# Patient Record
Sex: FEMALE | Race: WHITE | NOT HISPANIC OR LATINO | Employment: OTHER | URBAN - NONMETROPOLITAN AREA
[De-identification: names, ages, dates, MRNs, and addresses within clinical notes are randomized per-mention and may not be internally consistent; named-entity substitution may affect disease eponyms.]

---

## 2017-10-09 RX ORDER — DILTIAZEM HYDROCHLORIDE 120 MG/1
CAPSULE, EXTENDED RELEASE ORAL
Qty: 30 CAPSULE | Refills: 5 | Status: SHIPPED | OUTPATIENT
Start: 2017-10-09 | End: 2022-08-25 | Stop reason: SDUPTHER

## 2022-05-18 ENCOUNTER — OFFICE VISIT (OUTPATIENT)
Dept: CARDIOLOGY | Facility: CLINIC | Age: 47
End: 2022-05-18

## 2022-05-18 VITALS
OXYGEN SATURATION: 98 % | HEIGHT: 67 IN | BODY MASS INDEX: 22.13 KG/M2 | DIASTOLIC BLOOD PRESSURE: 77 MMHG | SYSTOLIC BLOOD PRESSURE: 115 MMHG | WEIGHT: 141 LBS | HEART RATE: 64 BPM

## 2022-05-18 DIAGNOSIS — R07.89 CHEST TIGHTNESS: ICD-10-CM

## 2022-05-18 DIAGNOSIS — R00.0 INAPPROPRIATE SINUS TACHYCARDIA: ICD-10-CM

## 2022-05-18 DIAGNOSIS — R06.02 SHORTNESS OF BREATH: ICD-10-CM

## 2022-05-18 DIAGNOSIS — R00.2 PALPITATIONS: Primary | ICD-10-CM

## 2022-05-18 PROCEDURE — 99204 OFFICE O/P NEW MOD 45 MIN: CPT | Performed by: NURSE PRACTITIONER

## 2022-05-18 PROCEDURE — 93000 ELECTROCARDIOGRAM COMPLETE: CPT | Performed by: NURSE PRACTITIONER

## 2022-05-18 RX ORDER — GABAPENTIN 600 MG/1
600 TABLET ORAL 2 TIMES DAILY
COMMUNITY

## 2022-05-18 RX ORDER — METOPROLOL SUCCINATE 25 MG/1
25 TABLET, EXTENDED RELEASE ORAL DAILY
Qty: 30 TABLET | Refills: 11 | Status: SHIPPED | OUTPATIENT
Start: 2022-05-18

## 2022-05-18 RX ORDER — HYDROCODONE BITARTRATE AND ACETAMINOPHEN 5; 325 MG/1; MG/1
1 TABLET ORAL EVERY 6 HOURS PRN
COMMUNITY

## 2022-05-18 RX ORDER — BISOPROLOL FUMARATE 10 MG/1
10 TABLET, FILM COATED ORAL DAILY
COMMUNITY
End: 2022-05-18 | Stop reason: SDUPTHER

## 2022-05-18 NOTE — PROGRESS NOTES
Subjective     Karen Gabehart is a 46 y.o. female who presents to day for Establish Care and COPD.    CHIEF COMPLIANT  Chief Complaint   Patient presents with   • Establish Care   • COPD       Active Problems:  Problem List Items Addressed This Visit        Cardiac and Vasculature    Palpitations - Primary    Relevant Medications    metoprolol succinate XL (TOPROL-XL) 25 MG 24 hr tablet    Other Relevant Orders    ECG 12 Lead    Cardiac Event Monitor    Adult Transthoracic Echo Complete W/ Cont if Necessary Per Protocol    Chest tightness    Relevant Orders    ECG 12 Lead    Adult Transthoracic Echo Complete W/ Cont if Necessary Per Protocol    Inappropriate sinus tachycardia    Relevant Medications    metoprolol succinate XL (TOPROL-XL) 25 MG 24 hr tablet    Other Relevant Orders    ECG 12 Lead    Adult Transthoracic Echo Complete W/ Cont if Necessary Per Protocol       Pulmonary and Pneumonias    Shortness of breath    Relevant Orders    ECG 12 Lead    Adult Transthoracic Echo Complete W/ Cont if Necessary Per Protocol      Problem list  1.  Shortness of breath  2.  Palpitations  3.  Chest tightness  4.  Hypertension    HPI  HPI  Ms. Gabehart is a 46-year-old female patient who is being seen today to establish care for cardiac evaluation of palpitations, shortness of breath and chest tightness.  Patient does have a history of SVT which she has palpitations with intermittent racing type sensations in her chest that is worse with activity but can occur at rest.  She says her heart rate will increase up to 178 with minimal activity and she becomes short of breath.  She is currently on diltiazem but does not seem to be working.  She reports that her shortness of breath is significantly worse with palpitations as well as activity and she has to stop and rest.  Along with the palpitations she does have intermittent chest pressure in which she is on isosorbide and Ranexa for antianginal therapy.  She does have a history  of chronic arterial hypertension as well in which she is being followed by her PCP.  Today her blood pressure is 115/77.  She is currently on diltiazem for her blood pressure.  She also has fatigue where she gets tired easily.  Dizziness and lightheadedness is expected if she changes positions too quickly.  She denies lower extremity edema, syncope, PND, orthopnea, or strokelike symptoms.    PRIOR MEDS  Current Outpatient Medications on File Prior to Visit   Medication Sig Dispense Refill   • diltiaZEM (TIAZAC) 120 MG 24 hr capsule TAKE ONE CAPSULE BY MOUTH DAILY 30 capsule 5   • gabapentin (NEURONTIN) 600 MG tablet Take 600 mg by mouth 2 (Two) Times a Day.     • HYDROcodone-acetaminophen (NORCO) 5-325 MG per tablet Take 1 tablet by mouth Every 6 (Six) Hours As Needed.       No current facility-administered medications on file prior to visit.       ALLERGIES  Patient has no known allergies.    HISTORY  Past Medical History:   Diagnosis Date   • Angina pectoris ([facility])    • Arrhythmia    • Arthritis    • Asthma    • Cancer, bladder, neck ([facility]) 06/2021   • COPD (chronic obstructive pulmonary disease) ([facility])    • Hypertension    • Irregular heart beat     pt states tachy at times   • SVT (supraventricular tachycardia) ([facility])        Social History     Socioeconomic History   • Marital status:    Tobacco Use   • Smoking status: Current Every Day Smoker     Packs/day: 0.50     Years: 30.00     Pack years: 15.00     Types: Cigarettes   • Smokeless tobacco: Never Used   Vaping Use   • Vaping Use: Never used   Substance and Sexual Activity   • Alcohol use: Yes     Alcohol/week: 2.0 standard drinks     Types: 2 Cans of beer per week     Comment: occasionally   • Drug use: No   • Sexual activity: Defer       Family History   Problem Relation Age of Onset   • Arrhythmia Mother        Review of Systems   Constitutional: Positive for fatigue. Negative for chills and fever.   HENT: Negative for congestion, rhinorrhea and sore  "throat.    Respiratory: Positive for chest tightness and shortness of breath.    Cardiovascular: Positive for chest pain and palpitations. Negative for leg swelling.   Gastrointestinal: Negative for constipation, diarrhea and nausea.   Musculoskeletal: Positive for arthralgias, back pain and neck pain.   Allergic/Immunologic: Positive for environmental allergies. Negative for food allergies.   Neurological: Positive for dizziness (getting up and down), weakness and light-headedness. Negative for syncope.   Hematological: Bruises/bleeds easily (bruise).   Psychiatric/Behavioral: Negative for sleep disturbance.       Objective     VITALS: /77 (BP Location: Left arm, Patient Position: Sitting)   Pulse 64   Ht 170.2 cm (67\")   Wt 64 kg (141 lb)   SpO2 98%   BMI 22.08 kg/m²     LABS:   Lab Results (most recent)     None          IMAGING:   No Images in the past 120 days found..    EXAM:  Physical Exam  Vitals and nursing note reviewed.   Constitutional:       Appearance: She is well-developed.   HENT:      Head: Normocephalic.   Eyes:      Pupils: Pupils are equal, round, and reactive to light.   Neck:      Thyroid: No thyroid mass.      Vascular: No carotid bruit or JVD.      Trachea: Trachea and phonation normal.   Cardiovascular:      Rate and Rhythm: Normal rate and regular rhythm.      Pulses:           Radial pulses are 2+ on the right side and 2+ on the left side.        Posterior tibial pulses are 2+ on the right side and 2+ on the left side.      Heart sounds: Normal heart sounds. No murmur heard.    No friction rub. No gallop.   Pulmonary:      Effort: Pulmonary effort is normal. No respiratory distress.      Breath sounds: Normal breath sounds. No wheezing or rales.   Abdominal:      General: Bowel sounds are normal.      Palpations: Abdomen is soft.   Musculoskeletal:         General: No swelling. Normal range of motion.      Cervical back: Neck supple.   Skin:     General: Skin is warm and dry.    "   Capillary Refill: Capillary refill takes less than 2 seconds.      Findings: No rash.   Neurological:      Mental Status: She is alert and oriented to person, place, and time.   Psychiatric:         Speech: Speech normal.         Behavior: Behavior normal.         Thought Content: Thought content normal.         Judgment: Judgment normal.         Procedure     ECG 12 Lead    Date/Time: 5/18/2022 11:28 AM  Performed by: Polo Neville APRN  Authorized by: Polo Neville APRN   Previous ECG: no previous ECG available  Rhythm: sinus bradycardia  Rate: tachycardic  BPM: 56  QRS axis: normal  Comments:  ms  No acute changes               Assessment & Plan    Diagnosis Plan   1. Palpitations  ECG 12 Lead    Cardiac Event Monitor    Adult Transthoracic Echo Complete W/ Cont if Necessary Per Protocol    metoprolol succinate XL (TOPROL-XL) 25 MG 24 hr tablet   2. Chest tightness  ECG 12 Lead    Adult Transthoracic Echo Complete W/ Cont if Necessary Per Protocol   3. Inappropriate sinus tachycardia  ECG 12 Lead    Adult Transthoracic Echo Complete W/ Cont if Necessary Per Protocol   4. Shortness of breath  ECG 12 Lead    Adult Transthoracic Echo Complete W/ Cont if Necessary Per Protocol   1.  Patient does have palpitations quite frequently which seem to interfere with her activities of daily living and caused her to become symptomatic with shortness of breath and chest pain.  She does have a remote history of SVT.  She is currently being treated with citalopram which does not seem to be working.  We will place her on metoprolol XL 25 mg daily to see if we can better control her palpitations.  We will also put patient on a 14-day cardiac event monitor to make sure there is no other arrhythmias or explanation for her symptoms.  2.  Also due to patient's palpitations, shortness of breath, and chest tightness we will get an echocardiogram for further evaluation of her structural anatomy and systolic and diastolic  function.  3.  Patient's blood pressure is controlled on current blood pressure medication regimen.  No medication changes are warranted at this time.  Patient advised to monitor blood pressure on a daily basis and report any persistent highs or lows.  Set goal blood pressure for patient at 130/80 or below.  4.  Informed of signs and symptoms of ACS and advised to seek emergent treatment for any new worsening symptoms.  Patient also advised sooner follow-up as needed.  Also advised to follow-up with family doctor as needed  This note is dictated utilizing voice recognition software.  Although this record has been proof read, transcriptional errors may still be present. If questions occur regarding the content of this record please do not hesitate to call our office.  I have reviewed and confirmed the accuracy of the ROS as documented by the MA/LPN/RN LEANNE Archibald    Return in about 3 months (around 8/18/2022), or if symptoms worsen or fail to improve.    Diagnoses and all orders for this visit:    1. Palpitations (Primary)  -     ECG 12 Lead  -     Cardiac Event Monitor; Future  -     Adult Transthoracic Echo Complete W/ Cont if Necessary Per Protocol; Future  -     metoprolol succinate XL (TOPROL-XL) 25 MG 24 hr tablet; Take 1 tablet by mouth Daily.  Dispense: 30 tablet; Refill: 11    2. Chest tightness  -     ECG 12 Lead  -     Adult Transthoracic Echo Complete W/ Cont if Necessary Per Protocol; Future    3. Inappropriate sinus tachycardia  -     ECG 12 Lead  -     Adult Transthoracic Echo Complete W/ Cont if Necessary Per Protocol; Future    4. Shortness of breath  -     ECG 12 Lead  -     Adult Transthoracic Echo Complete W/ Cont if Necessary Per Protocol; Future        Karen Gabehart  reports that she has been smoking cigarettes. She has a 15.00 pack-year smoking history. She has never used smokeless tobacco.. I have educated her on the risk of diseases from using tobacco products .       MEDS ORDERED  DURING VISIT:  New Medications Ordered This Visit   Medications   • metoprolol succinate XL (TOPROL-XL) 25 MG 24 hr tablet     Sig: Take 1 tablet by mouth Daily.     Dispense:  30 tablet     Refill:  11           This document has been electronically signed by Polo Neville Jr., APRN  May 23, 2022 00:20 EDT

## 2022-06-29 ENCOUNTER — APPOINTMENT (OUTPATIENT)
Dept: CARDIOLOGY | Facility: HOSPITAL | Age: 47
End: 2022-06-29

## 2022-07-22 ENCOUNTER — HOSPITAL ENCOUNTER (OUTPATIENT)
Dept: CARDIOLOGY | Facility: HOSPITAL | Age: 47
Discharge: HOME | End: 2022-07-22
Admitting: NURSE PRACTITIONER

## 2022-07-22 VITALS — WEIGHT: 141.09 LBS | BODY MASS INDEX: 22.15 KG/M2 | HEIGHT: 67 IN

## 2022-07-22 DIAGNOSIS — R06.02 SHORTNESS OF BREATH: ICD-10-CM

## 2022-07-22 DIAGNOSIS — R00.0 INAPPROPRIATE SINUS TACHYCARDIA: ICD-10-CM

## 2022-07-22 DIAGNOSIS — R07.89 CHEST TIGHTNESS: ICD-10-CM

## 2022-07-22 DIAGNOSIS — R00.2 PALPITATIONS: ICD-10-CM

## 2022-07-22 PROCEDURE — 93306 TTE W/DOPPLER COMPLETE: CPT | Performed by: INTERNAL MEDICINE

## 2022-07-22 PROCEDURE — 93306 TTE W/DOPPLER COMPLETE: CPT

## 2022-08-04 ENCOUNTER — APPOINTMENT (OUTPATIENT)
Dept: CARDIOLOGY | Facility: HOSPITAL | Age: 47
End: 2022-08-04

## 2022-08-16 ENCOUNTER — TELEPHONE (OUTPATIENT)
Dept: CARDIOLOGY | Facility: CLINIC | Age: 47
End: 2022-08-16

## 2022-08-16 LAB
AORTIC DIMENSIONLESS INDEX: 0.98 (DI)
BH CV ECHO MEAS - AO MAX PG: 4.9 MMHG
BH CV ECHO MEAS - AO MEAN PG: 2.27 MMHG
BH CV ECHO MEAS - AO ROOT DIAM: 3.1 CM
BH CV ECHO MEAS - AO V2 MAX: 111.1 CM/SEC
BH CV ECHO MEAS - AO V2 VTI: 22.2 CM
BH CV ECHO MEAS - EDV(CUBED): 52.7 ML
BH CV ECHO MEAS - EF_3D-VOL: 55 %
BH CV ECHO MEAS - ESV(CUBED): 18.6 ML
BH CV ECHO MEAS - FS: 29.4 %
BH CV ECHO MEAS - IVS/LVPW: 0.93 CM
BH CV ECHO MEAS - IVSD: 0.85 CM
BH CV ECHO MEAS - LA DIMENSION: 2.9 CM
BH CV ECHO MEAS - LAT PEAK E' VEL: 8.9 CM/SEC
BH CV ECHO MEAS - LV MASS(C)D: 95.3 GRAMS
BH CV ECHO MEAS - LV MAX PG: 4.9 MMHG
BH CV ECHO MEAS - LV MEAN PG: 2.48 MMHG
BH CV ECHO MEAS - LV V1 MAX: 110.7 CM/SEC
BH CV ECHO MEAS - LV V1 VTI: 24.2 CM
BH CV ECHO MEAS - LVIDD: 3.7 CM
BH CV ECHO MEAS - LVIDS: 2.6 CM
BH CV ECHO MEAS - LVPWD: 0.91 CM
BH CV ECHO MEAS - MED PEAK E' VEL: 7.5 CM/SEC
BH CV ECHO MEAS - MV A MAX VEL: 59.2 CM/SEC
BH CV ECHO MEAS - MV DEC SLOPE: 440.2 CM/SEC2
BH CV ECHO MEAS - MV DEC TIME: 0.27 MSEC
BH CV ECHO MEAS - MV E MAX VEL: 66.8 CM/SEC
BH CV ECHO MEAS - MV E/A: 1.13
BH CV ECHO MEAS - MV MAX PG: 2.27 MMHG
BH CV ECHO MEAS - MV MEAN PG: 1.36 MMHG
BH CV ECHO MEAS - MV P1/2T: 53.4 MSEC
BH CV ECHO MEAS - MV V2 VTI: 22.3 CM
BH CV ECHO MEAS - MVA(P1/2T): 4.1 CM2
BH CV ECHO MEAS - PA V2 MAX: 83.1 CM/SEC
BH CV ECHO MEAS - RAP SYSTOLE: 10 MMHG
BH CV ECHO MEAS - RV MAX PG: 2.2 MMHG
BH CV ECHO MEAS - RV V1 MAX: 74.1 CM/SEC
BH CV ECHO MEAS - RV V1 VTI: 16.7 CM
BH CV ECHO MEAS - RVDD: 3.5 CM
BH CV ECHO MEAS - RVSP: 28.1 MMHG
BH CV ECHO MEAS - TAPSE (>1.6): 1.94 CM
BH CV ECHO MEAS - TR MAX PG: 18.1 MMHG
BH CV ECHO MEAS - TR MAX VEL: 212.5 CM/SEC
BH CV ECHO MEASUREMENTS AVERAGE E/E' RATIO: 8.15
BH CV XLRA - TDI S': 12.9 CM/SEC
IVRT: 82 MSEC
MAXIMAL PREDICTED HEART RATE: 172 BPM
SINUS: 3.3 CM
STRESS TARGET HR: 146 BPM

## 2022-08-16 NOTE — TELEPHONE ENCOUNTER
ECHO  Called pt to notify of no acute findings on testing, no answer lvm.  ----- Message from Netta Rivera MA sent at 8/16/2022 12:58 PM EDT -----    ----- Message -----  From: Polo Neville APRN  Sent: 8/16/2022  11:25 AM EDT  To: Netta Rivera MA    There is no acute findings on the echocardiogram.  Keep follow-up.

## 2022-08-25 ENCOUNTER — OFFICE VISIT (OUTPATIENT)
Dept: CARDIOLOGY | Facility: CLINIC | Age: 47
End: 2022-08-25

## 2022-08-25 VITALS
OXYGEN SATURATION: 99 % | HEIGHT: 67 IN | SYSTOLIC BLOOD PRESSURE: 124 MMHG | HEART RATE: 79 BPM | BODY MASS INDEX: 21.63 KG/M2 | DIASTOLIC BLOOD PRESSURE: 85 MMHG | WEIGHT: 137.8 LBS

## 2022-08-25 DIAGNOSIS — I10 PRIMARY HYPERTENSION: Primary | ICD-10-CM

## 2022-08-25 DIAGNOSIS — R00.2 PALPITATIONS: ICD-10-CM

## 2022-08-25 DIAGNOSIS — R42 DIZZINESS: ICD-10-CM

## 2022-08-25 PROCEDURE — 99213 OFFICE O/P EST LOW 20 MIN: CPT | Performed by: NURSE PRACTITIONER

## 2022-08-25 NOTE — PROGRESS NOTES
Subjective     Karen Gabehart is a 46 y.o. female who presents to day for 3 month testing and monitor.    CHIEF COMPLIANT  Chief Complaint   Patient presents with   • 3 month testing and monitor       Active Problems:  Problem List Items Addressed This Visit        Cardiac and Vasculature    Palpitations      Other Visit Diagnoses     Primary hypertension    -  Primary    Dizziness          Problem list  1.  Shortness of breath  2.  Palpitations  3.  Chest tightness  4.  Hypertension    HPI  HPI    Ms. Gabeheart presents today for testing results follow up. She does have a history of chronic arterial hypertension, which her blood pressure is well controlled today at 124/85 mmHg, heart rate is 78 BPM. She is currently on metoprolol succinate. The patient discontinued diltiazem since her blood pressure readings at home were getting low. She reports normal blood pressure readings since discontinuing diltiazem.     The patient notes that her palpitations only occur upon exertion, like if she is doing yardwork.  The palpitations do resolve quite quickly with rest    She experiences dizziness if she stands up to quickly or when bending over. She reports having ear trouble and states that could be contributing to her dizziness.     She denies chest pain, shortness of breath, lower extremity edema, fatigue, syncope, PND, orthopnea, or strokelike symptoms      PRIOR MEDS  Current Outpatient Medications on File Prior to Visit   Medication Sig Dispense Refill   • gabapentin (NEURONTIN) 600 MG tablet Take 600 mg by mouth 2 (Two) Times a Day.     • HYDROcodone-acetaminophen (NORCO) 5-325 MG per tablet Take 1 tablet by mouth Every 6 (Six) Hours As Needed.     • metoprolol succinate XL (TOPROL-XL) 25 MG 24 hr tablet Take 1 tablet by mouth Daily. 30 tablet 11   • [DISCONTINUED] diltiaZEM (TIAZAC) 120 MG 24 hr capsule TAKE ONE CAPSULE BY MOUTH DAILY 30 capsule 5     No current facility-administered medications on file prior to  "visit.       ALLERGIES  Patient has no known allergies.    HISTORY  Past Medical History:   Diagnosis Date   • Angina pectoris ([facility])    • Arrhythmia    • Arthritis    • Asthma    • Cancer, bladder, neck ([facility]) 06/2021   • COPD (chronic obstructive pulmonary disease) ([facility])    • Hypertension    • Irregular heart beat     pt states tachy at times   • SVT (supraventricular tachycardia) ([facility])        Social History     Socioeconomic History   • Marital status:    Tobacco Use   • Smoking status: Current Every Day Smoker     Packs/day: 0.50     Years: 30.00     Pack years: 15.00     Types: Cigarettes   • Smokeless tobacco: Never Used   Vaping Use   • Vaping Use: Never used   Substance and Sexual Activity   • Alcohol use: Yes     Alcohol/week: 2.0 standard drinks     Types: 2 Cans of beer per week     Comment: occasionally   • Drug use: No   • Sexual activity: Defer       Family History   Problem Relation Age of Onset   • Arrhythmia Mother        Review of Systems   Constitutional: Negative for chills, fatigue and fever.   HENT: Negative for congestion, rhinorrhea and sore throat.    Respiratory: Negative for chest tightness and shortness of breath.    Cardiovascular: Positive for palpitations. Negative for chest pain and leg swelling.   Gastrointestinal: Negative for constipation, diarrhea and nausea.   Musculoskeletal: Positive for arthralgias and neck pain. Negative for back pain.   Allergic/Immunologic: Positive for environmental allergies. Negative for food allergies.   Neurological: Positive for dizziness (getting up and down bending over), weakness and light-headedness. Negative for syncope.   Hematological: Does not bruise/bleed easily.   Psychiatric/Behavioral: Negative for sleep disturbance.       Objective     VITALS: /85 (BP Location: Left arm, Patient Position: Sitting)   Pulse 79   Ht 170 cm (66.93\")   Wt 62.5 kg (137 lb 12.8 oz)   SpO2 99%   BMI 21.63 kg/m²     LABS:   Lab Results (most " recent)     None          IMAGING:   No Images in the past 120 days found..    EXAM:  Physical Exam  Vitals and nursing note reviewed.   Constitutional:       Appearance: She is well-developed.   HENT:      Head: Normocephalic.   Eyes:      Pupils: Pupils are equal, round, and reactive to light.   Neck:      Thyroid: No thyroid mass.      Vascular: No carotid bruit or JVD.      Trachea: Trachea and phonation normal.   Cardiovascular:      Rate and Rhythm: Normal rate and regular rhythm.      Pulses:           Radial pulses are 2+ on the right side and 2+ on the left side.        Posterior tibial pulses are 2+ on the right side and 2+ on the left side.      Heart sounds: Normal heart sounds. No murmur heard.    No friction rub. No gallop.   Pulmonary:      Effort: Pulmonary effort is normal. No respiratory distress.      Breath sounds: Normal breath sounds. No wheezing or rales.   Abdominal:      General: Bowel sounds are normal.      Palpations: Abdomen is soft.   Musculoskeletal:         General: No swelling. Normal range of motion.      Cervical back: Neck supple.   Skin:     General: Skin is warm and dry.      Capillary Refill: Capillary refill takes less than 2 seconds.      Findings: No rash.   Neurological:      Mental Status: She is alert and oriented to person, place, and time.   Psychiatric:         Speech: Speech normal.         Behavior: Behavior normal.         Thought Content: Thought content normal.         Judgment: Judgment normal.         Procedure   Procedures       Assessment & Plan    Diagnosis Plan   1. Primary hypertension     2. Palpitations     3. Dizziness       Plan  1. The patient is still experiencing palpitations upon exertion. Heart monitor and echocardiogram revealed normal results.   2. Hypertension is well controlled with metoprolol succinate.  She will continue to monitor blood pressure and report any significant highs or lows.  3. The patient will follow up with me in 1 year.      Assesment   1. Discussed results of heart monitor and echogardiogram at length during this visit. Heart monitor results redominant heart rhythm is normal sinus rhythm with an average heart rate of 83 BPM with episode of sinus bradycardia to at 54 bpm at 6 AM. Sinus tachycardia of 176 BPM at 9:37 AM, no arrhythmias, no significant changes. No atrial fibrillation or atrial flutter. Very minimal evidence of PVCs or PACs. Echocardiogram results revealed ejection fraction of 55 percent, normal diastolic function, trivial leak in the tricuspid valve, no pulmonary hypertension, no signs of fluid accumulation.   2. The patient is to continue taking metoprolol succinate for hypertension control.  3. The patient will follow up in 6 months.  4.  Informed of signs and symptoms of ACS and advised to seek emergent treatment for any new worsening symptoms.  Patient also advised sooner follow-up as needed.  Also advised to follow-up with family doctor as needed  This note is dictated utilizing voice recognition software.  Although this record has been proof read, transcriptional errors may still be present. If questions occur regarding the content of this record please do not hesitate to call our office.  I have reviewed and confirmed the accuracy of the ROS as documented by the MA/LPN/RN LEANNE Archibald    Return in about 6 months (around 2/25/2023), or if symptoms worsen or fail to improve.    Diagnoses and all orders for this visit:    1. Primary hypertension (Primary)    2. Palpitations    3. Dizziness        Karen Gabehart  reports that she has been smoking cigarettes. She has a 15.00 pack-year smoking history. She has never used smokeless tobacco.. I have educated her on the risk of diseases from using tobacco products.          MEDS ORDERED DURING VISIT:  No orders of the defined types were placed in this encounter.          This document has been electronically signed by LEANNE Archibald Jr.  August 25, 2022  21:27 EDT      .[email or web address] Tona Albrecht.

## 2023-06-01 ENCOUNTER — OFFICE VISIT (OUTPATIENT)
Dept: CARDIOLOGY | Facility: CLINIC | Age: 48
End: 2023-06-01
Payer: MEDICARE

## 2023-06-01 VITALS
BODY MASS INDEX: 21.35 KG/M2 | DIASTOLIC BLOOD PRESSURE: 70 MMHG | OXYGEN SATURATION: 96 % | HEIGHT: 67 IN | HEART RATE: 85 BPM | WEIGHT: 136 LBS | SYSTOLIC BLOOD PRESSURE: 108 MMHG

## 2023-06-01 DIAGNOSIS — R07.89 CHEST TIGHTNESS: Primary | ICD-10-CM

## 2023-06-01 DIAGNOSIS — R06.02 SHORTNESS OF BREATH: ICD-10-CM

## 2023-06-01 DIAGNOSIS — R00.2 PALPITATIONS: ICD-10-CM

## 2023-06-01 DIAGNOSIS — R00.0 INAPPROPRIATE SINUS TACHYCARDIA: ICD-10-CM

## 2023-06-01 PROCEDURE — 1159F MED LIST DOCD IN RCRD: CPT | Performed by: NURSE PRACTITIONER

## 2023-06-01 PROCEDURE — 1160F RVW MEDS BY RX/DR IN RCRD: CPT | Performed by: NURSE PRACTITIONER

## 2023-06-01 PROCEDURE — 99213 OFFICE O/P EST LOW 20 MIN: CPT | Performed by: NURSE PRACTITIONER

## 2023-06-01 RX ORDER — METOPROLOL SUCCINATE 25 MG/1
25 TABLET, EXTENDED RELEASE ORAL DAILY
Qty: 90 TABLET | Refills: 3 | Status: SHIPPED | OUTPATIENT
Start: 2023-06-01

## 2023-06-01 NOTE — PROGRESS NOTES
Subjective     Karen Gabehart is a 47 y.o. female who presents to day for Follow-up (Here for 6 mo. F/u), Hypertension, and Rapid Heart Rate (HX SVT).    CHIEF COMPLIANT  Chief Complaint   Patient presents with    Follow-up     Here for 6 mo. F/u    Hypertension    Rapid Heart Rate     HX SVT     Problem list:    1.  Shortness of breath  2.  Palpitations  3.  Chest tightness  4.  Hypertension    Problem List Items Addressed This Visit          Cardiac and Vasculature    Palpitations    Chest tightness - Primary    Inappropriate sinus tachycardia       Pulmonary and Pneumonias    Shortness of breath       HPI    Karen Gabehart is a 47-year-old female patient being followed up today for chronic arterial hypertension and history of supraventricular tachycardia. Patient does have chronic arterial hypertension, which she has been treated with metoprolol. Today, her blood pressure is 108/70 mmHg with a heart rate of 85 beats per minute. She is treated with supraventricular tachycardia as well with the metoprolol.    The patient states she is doing well. Her palpitations are not occurring often. She reports that she walked quite a bit of stairs the other day and experienced some difficulty. She adds the metoprolol is working well for her. She states if she misses a dose, she can tell a difference the next day.    She maintains that she experiences shortness of breath with increase levels of exertion. She reports that she walked 5 miles the other day. She denies shortness of breath at rest or when lying flat.     The patient denies angina, chest pressure, or chest tightness.     She does not recall if she needs a refill on her medications or not.        PRIOR MEDS  Current Outpatient Medications on File Prior to Visit   Medication Sig Dispense Refill    gabapentin (NEURONTIN) 600 MG tablet Take 1 tablet by mouth 2 (Two) Times a Day.      HYDROcodone-acetaminophen (NORCO) 5-325 MG per tablet Take 1 tablet by mouth Every 6  "(Six) Hours As Needed.      metoprolol succinate XL (TOPROL-XL) 25 MG 24 hr tablet Take 1 tablet by mouth Daily. 30 tablet 11     No current facility-administered medications on file prior to visit.       ALLERGIES  Patient has no known allergies.    HISTORY  Past Medical History:   Diagnosis Date    Angina pectoris     Arrhythmia     Arthritis     Asthma     Cancer, bladder, neck 06/2021    COPD (chronic obstructive pulmonary disease)     Hypertension     Irregular heart beat     pt states tachy at times    SVT (supraventricular tachycardia)        Social History     Socioeconomic History    Marital status:    Tobacco Use    Smoking status: Every Day     Packs/day: 0.50     Years: 30.00     Pack years: 15.00     Types: Cigarettes    Smokeless tobacco: Never   Vaping Use    Vaping Use: Never used   Substance and Sexual Activity    Alcohol use: Yes     Alcohol/week: 2.0 standard drinks     Types: 2 Cans of beer per week     Comment: occasionally    Drug use: No    Sexual activity: Defer       Family History   Problem Relation Age of Onset    Arrhythmia Mother        Review of Systems   Constitutional: Negative.    HENT: Negative.    Eyes: Negative.    Respiratory: Positive for shortness of breath (occas. ).    Cardiovascular: Positive for palpitations (occas. racing). Negative for chest pain and leg swelling.   Gastrointestinal: Negative.    Endocrine: Negative.    Genitourinary: Negative.    Musculoskeletal: Positive for arthralgias and myalgias.   Skin: Negative.    Allergic/Immunologic: Negative.    Neurological: Negative.    Hematological: Negative.    Psychiatric/Behavioral: Negative.        Objective     VITALS: /70 (BP Location: Left arm, Patient Position: Sitting)   Pulse 85   Ht 170 cm (66.93\")   Wt 61.7 kg (136 lb)   SpO2 96%   BMI 21.35 kg/m²     LABS:   Lab Results (most recent)       None            Echocardiogram revealed ejection fraction of 55 percent, normal resting pressures, grade " 1 diastolic dysfunction, right and left atrium on the right side of the heart were also normal, left ventricle was normal. Pulmonary pressure are approximately at 30 percent, and her tricuspid valve leaks trivially.     IMAGING:   No Images in the past 120 days found..    EXAM:  Physical Exam  Vitals and nursing note reviewed.   Constitutional:       Appearance: She is well-developed.   HENT:      Head: Normocephalic.   Neck:      Thyroid: No thyroid mass.      Vascular: No carotid bruit or JVD.      Trachea: Trachea and phonation normal.   Cardiovascular:      Rate and Rhythm: Normal rate and regular rhythm.      Pulses:           Radial pulses are 2+ on the right side and 2+ on the left side.        Posterior tibial pulses are 2+ on the right side and 2+ on the left side.      Heart sounds: Normal heart sounds. No murmur heard.    No friction rub. No gallop.   Pulmonary:      Effort: Pulmonary effort is normal. No respiratory distress.      Breath sounds: Normal breath sounds. No wheezing or rales.   Musculoskeletal:         General: Normal range of motion.      Cervical back: Neck supple.   Skin:     General: Skin is warm and dry.      Capillary Refill: Capillary refill takes less than 2 seconds.      Findings: No rash.   Neurological:      Mental Status: She is alert and oriented to person, place, and time.   Psychiatric:         Speech: Speech normal.         Behavior: Behavior normal.         Thought Content: Thought content normal.         Judgment: Judgment normal.       Procedure   Procedures       Assessment & Plan    Diagnosis Plan   1. Chest tightness        2. Inappropriate sinus tachycardia        3. Palpitations        4. Shortness of breath        PLAN  1. The patient's most recent cardiac testing results were discussed in full detail during this visit.  2. Submitted a refill for the patient's medications for 90 days.  3.  Patient reports that her palpitations are relatively well under control and  they do not happen very often.  She says they are at acceptable range and mainly occurs with activity.  4.  Patient has a good functional capacity walking 5 miles here recently.  Her shortness of breath only occurs with increased levels of activity and seems to be stable.  5.  Patient denies any chest pain chest tightness chest pressure or other anginal symptoms.  We will continue to monitor at this time.  6.  Informed of signs and symptoms of ACS and advised to seek emergent treatment for any new worsening symptoms.  Patient also advised sooner follow-up as needed.  Also advised to follow-up with family doctor as needed  This note is dictated utilizing voice recognition software.  Although this record has been proof read, transcriptional errors may still be present. If questions occur regarding the content of this record please do not hesitate to call our office.  I have reviewed and confirmed the accuracy of the ROS as documented by the MA/LPN/RN LEANNE Archibald    Return in about 1 year (around 6/1/2024), or if symptoms worsen or fail to improve.    Diagnoses and all orders for this visit:    1. Chest tightness (Primary)    2. Inappropriate sinus tachycardia    3. Palpitations  -     metoprolol succinate XL (TOPROL-XL) 25 MG 24 hr tablet; Take 1 tablet by mouth Daily.  Dispense: 90 tablet; Refill: 3    4. Shortness of breath        Karen Gabehart  reports that she has been smoking cigarettes. She has a 15.00 pack-year smoking history. She has never used smokeless tobacco.. I have educated her on the risk of diseases from using tobacco products such as cancer, COPD and heart disease.     I advised her to quit and she is not willing to quit.    I spent 3  minutes counseling the patient.           BMI is within normal parameters. No other follow-up for BMI required.           MEDS ORDERED DURING VISIT:  New Medications Ordered This Visit   Medications    metoprolol succinate XL (TOPROL-XL) 25 MG 24 hr tablet      Sig: Take 1 tablet by mouth Daily.     Dispense:  90 tablet     Refill:  3       The patient will follow- up in 1 year.    Assessment  1. Chronic arterial hypertension  2. SVT  3. Palpitations        This document has been electronically signed by LEANNE Archibadl Jr.  June 1, 2023 16:02 EDT     Transcribed from ambient dictation for LEANNE Archibald by Christa Lang.  06/01/23   17:47 EDT    Patient or patient representative verbalized consent to the visit recording.  I have personally performed the services described in this document as transcribed by the above individual, and it is both accurate and complete.

## 2024-05-29 DIAGNOSIS — R00.2 PALPITATIONS: ICD-10-CM

## 2024-05-29 RX ORDER — METOPROLOL SUCCINATE 25 MG/1
25 TABLET, EXTENDED RELEASE ORAL DAILY
Qty: 90 TABLET | Refills: 3 | Status: SHIPPED | OUTPATIENT
Start: 2024-05-29

## 2024-06-03 ENCOUNTER — TELEPHONE (OUTPATIENT)
Dept: CARDIOLOGY | Facility: CLINIC | Age: 49
End: 2024-06-03

## 2024-06-03 NOTE — TELEPHONE ENCOUNTER
Caller: Gabehart, Karen    Relationship: Self    Best call back number: [phone]     What was the call regarding:   PT HAD FOLLOW APPT ON 6/3 AND DUE TO UNFORSEEN CIRCUMSTANCES, THEY HAD TO RESCHEDULE. NEXT AVAILABLE WAS  9/5. JUST WANTED TO MAKE OFFICE AWARE.

## 2024-09-05 ENCOUNTER — OFFICE VISIT (OUTPATIENT)
Dept: CARDIOLOGY | Facility: CLINIC | Age: 49
End: 2024-09-05
Payer: MEDICARE

## 2024-09-05 VITALS
BODY MASS INDEX: 21.69 KG/M2 | HEART RATE: 73 BPM | SYSTOLIC BLOOD PRESSURE: 107 MMHG | WEIGHT: 138.2 LBS | OXYGEN SATURATION: 96 % | HEIGHT: 67 IN | DIASTOLIC BLOOD PRESSURE: 71 MMHG

## 2024-09-05 DIAGNOSIS — I47.11 INAPPROPRIATE SINUS TACHYCARDIA: ICD-10-CM

## 2024-09-05 DIAGNOSIS — R00.2 PALPITATIONS: Primary | ICD-10-CM

## 2024-09-05 PROCEDURE — 1159F MED LIST DOCD IN RCRD: CPT | Performed by: NURSE PRACTITIONER

## 2024-09-05 PROCEDURE — 1160F RVW MEDS BY RX/DR IN RCRD: CPT | Performed by: NURSE PRACTITIONER

## 2024-09-05 PROCEDURE — 93000 ELECTROCARDIOGRAM COMPLETE: CPT | Performed by: NURSE PRACTITIONER

## 2024-09-05 PROCEDURE — 99213 OFFICE O/P EST LOW 20 MIN: CPT | Performed by: NURSE PRACTITIONER

## 2025-05-25 DIAGNOSIS — R00.2 PALPITATIONS: ICD-10-CM

## 2025-05-27 RX ORDER — METOPROLOL SUCCINATE 25 MG/1
25 TABLET, EXTENDED RELEASE ORAL DAILY
Qty: 90 TABLET | Refills: 3 | Status: SHIPPED | OUTPATIENT
Start: 2025-05-27